# Patient Record
Sex: MALE | Race: WHITE | NOT HISPANIC OR LATINO | URBAN - METROPOLITAN AREA
[De-identification: names, ages, dates, MRNs, and addresses within clinical notes are randomized per-mention and may not be internally consistent; named-entity substitution may affect disease eponyms.]

---

## 2020-07-06 ENCOUNTER — EMERGENCY (EMERGENCY)
Facility: HOSPITAL | Age: 55
LOS: 0 days | Discharge: HOME | End: 2020-07-06
Payer: MEDICAID

## 2020-07-06 VITALS
HEIGHT: 70 IN | TEMPERATURE: 99 F | OXYGEN SATURATION: 97 % | WEIGHT: 149.91 LBS | SYSTOLIC BLOOD PRESSURE: 151 MMHG | HEART RATE: 120 BPM | RESPIRATION RATE: 16 BRPM | DIASTOLIC BLOOD PRESSURE: 102 MMHG

## 2020-07-06 VITALS
HEART RATE: 109 BPM | RESPIRATION RATE: 15 BRPM | OXYGEN SATURATION: 96 % | TEMPERATURE: 98 F | SYSTOLIC BLOOD PRESSURE: 142 MMHG | DIASTOLIC BLOOD PRESSURE: 91 MMHG

## 2020-07-06 DIAGNOSIS — S50.11XA CONTUSION OF RIGHT FOREARM, INITIAL ENCOUNTER: ICD-10-CM
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
## 2020-07-06 PROCEDURE — 73080 X-RAY EXAM OF ELBOW: CPT | Mod: 26,RT

## 2020-07-06 PROCEDURE — 99284 EMERGENCY DEPT VISIT MOD MDM: CPT

## 2020-07-06 PROCEDURE — 73090 X-RAY EXAM OF FOREARM: CPT | Mod: 26,RT
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
## 2020-07-06 NOTE — ED PROVIDER NOTE - CLINICAL SUMMARY MEDICAL DECISION MAKING FREE TEXT BOX
ELEVATION, ICE, CONTINUE KEFLEX, AND SEE DR WOMACK IN FEW DAYS  Discussed results and outcome of testing with the patient.  Patient advised to please follow up with their primary care doctor within the next 24-48 hours and return to the Emergency Department for worsening symptoms or any other concerns.  Patient advised that their doctor may call  to follow up on the specific results of the tests performed today in the emergency department.
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
## 2022-02-21 NOTE — ED ADULT NURSE NOTE - CHPI ED NUR SYMPTOMS POS
Constitutional: (-) fever, (-) chills  Eyes: (-) visual changes  ENT: (-) nasal congestions  Cardiovascular: (-) chest pain, (-) syncope  Respiratory: (-) cough, (-) shortness of breath, (-) dyspnea,   Gastrointestinal: (-) vomiting, (-) diarrhea, (-)nausea,  Musculoskeletal: (-) neck pain, (-) back pain, (-) joint pain,  Integumentary: (-) rash, (-) edema, (-) bruises  Neurological: (-) headache, (-) loc, (-) dizziness, (-) tingling, (-)numbness,  Peripheral Vascular: (-) leg swelling  :  (-)dysuria,  (-) hematuria  Allergic/Immunologic: (-) pruritus PAIN
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
## 2024-01-11 NOTE — ED PROVIDER NOTE - PATIENT PORTAL LINK FT
You can access the FollowMyHealth Patient Portal offered by [facility] by registering at the following website: [email or web address]. By joining [email or web address]’s FollowMyHealth portal, you will also be able to view your health information using other applications (apps) compatible with our system.
PAST SURGICAL HISTORY:  History of tonsillectomy at 20 yrs old    S/P tonsillectomy

## 2024-06-10 ENCOUNTER — EMERGENCY (EMERGENCY)
Facility: HOSPITAL | Age: 59
LOS: 0 days | Discharge: HOME | End: 2024-06-10
Attending: STUDENT IN AN ORGANIZED HEALTH CARE EDUCATION/TRAINING PROGRAM
Payer: MEDICAID

## 2024-06-10 VITALS
DIASTOLIC BLOOD PRESSURE: 73 MMHG | HEART RATE: 95 BPM | SYSTOLIC BLOOD PRESSURE: 112 MMHG | OXYGEN SATURATION: 100 % | TEMPERATURE: 98 F | RESPIRATION RATE: 18 BRPM

## 2024-06-10 VITALS
SYSTOLIC BLOOD PRESSURE: 161 MMHG | HEIGHT: 70 IN | OXYGEN SATURATION: 100 % | RESPIRATION RATE: 18 BRPM | DIASTOLIC BLOOD PRESSURE: 103 MMHG | WEIGHT: 139.99 LBS | HEART RATE: 111 BPM | TEMPERATURE: 98 F
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
## 2024-06-10 DIAGNOSIS — M25.531 PAIN IN RIGHT WRIST: ICD-10-CM

## 2024-06-10 DIAGNOSIS — I10 ESSENTIAL (PRIMARY) HYPERTENSION: ICD-10-CM

## 2024-06-10 DIAGNOSIS — F17.200 NICOTINE DEPENDENCE, UNSPECIFIED, UNCOMPLICATED: ICD-10-CM
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
## 2024-06-10 DIAGNOSIS — S52.501A UNSPECIFIED FRACTURE OF THE LOWER END OF RIGHT RADIUS, INITIAL ENCOUNTER FOR CLOSED FRACTURE: ICD-10-CM
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
## 2024-06-10 PROCEDURE — 73130 X-RAY EXAM OF HAND: CPT | Mod: 26,RT

## 2024-06-10 PROCEDURE — 99285 EMERGENCY DEPT VISIT HI MDM: CPT

## 2024-06-10 PROCEDURE — 73080 X-RAY EXAM OF ELBOW: CPT | Mod: 26,RT

## 2024-06-10 PROCEDURE — 73090 X-RAY EXAM OF FOREARM: CPT | Mod: 26,RT,76

## 2024-06-10 PROCEDURE — 73110 X-RAY EXAM OF WRIST: CPT | Mod: 26,RT,76

## 2024-06-10 RX ORDER — ACETAMINOPHEN 500 MG
650 TABLET ORAL ONCE
Refills: 0 | Status: COMPLETED | OUTPATIENT
Start: 2024-06-10 | End: 2024-06-10

## 2024-06-10 RX ORDER — IBUPROFEN 200 MG
600 TABLET ORAL ONCE
Refills: 0 | Status: COMPLETED | OUTPATIENT
Start: 2024-06-10 | End: 2024-06-10

## 2024-06-10 RX ORDER — CEPHALEXIN 500 MG
1 CAPSULE ORAL
Qty: 0 | Refills: 0 | DISCHARGE

## 2024-06-10 RX ADMIN — Medication 650 MILLIGRAM(S): at 09:58

## 2024-06-10 RX ADMIN — Medication 600 MILLIGRAM(S): at 08:04

## 2024-06-10 RX ADMIN — Medication 600 MILLIGRAM(S): at 09:58

## 2024-06-10 RX ADMIN — Medication 650 MILLIGRAM(S): at 08:04

## 2024-06-10 NOTE — ED ADULT TRIAGE NOTE - CHIEF COMPLAINT QUOTE
c/o R wrist pain since last night. States he was playing baseball and "dove down" to try and catch ball when he heard a pop. Unable to move wrist, swelling noted.

## 2024-06-10 NOTE — ED PROVIDER NOTE - PHYSICAL EXAMINATION
Gen: AOX3, NAD  Head: NCAT  ENT: Airway patent, moist mucous membranes, nasal passageways clear  Cardiac: Normal rate, rhythm   Respiratory: normal respirations and normal work of breathing , lungs cta b/l   Gastrointestinal: Abdomen nondistended, central adiposity absent  MSK: + right wrist swelling and ecchymosis with ttp and mild local deformity, no elbow/shoulder pain, normal radial/median/ulnar motor/sensory fcn, compartments soft, 2+ Radial/ulnar pulses RUE , no midline c-t-l spine ttp.   Skin: No rashes, no lesions  Neuro: No gross neurologic deficits, normal speech, no gait abnormality

## 2024-06-10 NOTE — ED PROVIDER NOTE - PROVIDER TOKENS
Call placed to patient, no answer. Left VM for patient to return call.  Patient can schedule with Karla WALL to inquire about continuing Vvyanse script.   We will need records from office that diagnosed patient with ADD. Script has not been filled since May of 2023.   PROVIDER:[TOKEN:[[id number]]]

## 2024-06-10 NOTE — CONSULT NOTE ADULT - SUBJECTIVE AND OBJECTIVE BOX
HPI  59yMale R-hand dominant c/o R wrist pain s/p mechanical fall while playing baseball last night. Denies headstrike or LOC. Denies numbness/tingling in the RUE. Denies any other trauma/injuries at this time.    ROS  Negative unless otherwise specified in HPI.    PAST MEDICAL & SURGICAL Hx  PAST MEDICAL & SURGICAL HISTORY:  HTN (hypertension)      No significant past surgical history          MEDICATIONS  Home Medications:  cephalexin 500 mg oral capsule: 1 cap(s) orally 3 times a day (10 Hernando 2024 07:38)      ALLERGIES  No Known Allergies      FAMILY Hx  FAMILY HISTORY:      SOCIAL Hx  Social History:      VITALS  Vital Signs Last 24 Hrs  T(C): 36.4 (10 Hernando 2024 07:15), Max: 36.4 (10 Hernando 2024 07:15)  T(F): 97.6 (10 Hernando 2024 07:15), Max: 97.6 (10 Hernando 2024 07:15)  HR: 111 (10 Hernando 2024 07:15) (111 - 111)  BP: 161/103 (10 Hernando 2024 07:15) (161/103 - 161/103)  BP(mean): --  RR: 18 (10 Hernando 2024 07:15) (18 - 18)  SpO2: 100% (10 Hernando 2024 07:15) (100% - 100%)    Parameters below as of 10 Hernando 2024 07:15  Patient On (Oxygen Delivery Method): room air        PHYSICAL EXAM  Gen: Lying in bed, NAD  Resp: No increased WOB  RUE:  Skin intact, +visible wrist deformity, +edema and +ecchymosis over wrist  +TTP over wrist, no TTP along remainder of extremity; compartments soft  Limited ROM at wrist 2/2 pain  Motor: AIN/PIN/U intact  Sensory: Med/Rad/U SILT  +Rad pulse, fingers WWP    Secondary Assessment:  NC/AT, NTTP of clavicles, NTTP of C-spine,T-spine, or L-spine in the midline and paraspinal areas; NTTP of pelvis  LUE: NTTP of Shoulder, Elbow, Wrist, Hand; NT with AROM/PROM of Shoulder, Elbow, Wrist, Hand; AIN/PIN/Med/Uln/Msc/Rad/Ax intact  LLE: Able to SLR, NT with Log Roll, NT with Heel Strike, NTTP of Hip, Knee, Ankle, Foot; NT with AROM/PROM of Hip, Knee, Ankle, Foot; Q/H/GSC/TA/EHL/FHL intact  RLE: Able to SLR, NT with Log Roll, NT with Heel Strike, NTTP of Hip, Knee, Ankle, Foot; NT with AROM/PROM of Hip, Knee, Ankle, Foot; Q/H/GSC/TA/EHL/FHL intact      LABS              IMAGING  XRs: R distal radius fx (personal read)    PROCEDURE  Using aseptic technique, a hematoma block was administered to the fracture site using 10cc of 1% lidocaine without epinephrine. Closed reduction was subsequently performed and a well-padded, well-molded plaster splint applied. The patient tolerated the procedure well without evidence of complications. The patient was neurovascularly intact following reduction. Post-reduction XRs demonstrated acceptable alignment. The patient was informed about splint precautions (keep dry, do not stick anything inside, monitor for signs/symptoms of increased compartmental pressure: uncontrolled pain, worsening numbness/tingling, severe pain with movement of the fingers) and verbalized understanding.    ASSESSMENT & PLAN  59yMale w/ R distal radius fx s/p closed reduction and immobilization.  -NWB RUE in a sugar tong splint (and sling for comfort PRN, does not need to wear sling at all times)  -splint precautions  -pain control  -ice/cold compress, elevation  -finger ROM encouraged to prevent stiffness  -no acute ortho surgery at this time  -f/u outpt with Dr. Simpson within 1 week, call office for appt

## 2024-06-10 NOTE — ED PROVIDER NOTE - OBJECTIVE STATEMENT
59-year-old male past medical history of hypertension presents with right wrist pain, and difficulty moving right wrist joint, occurring after fall yesterday on outstretched hand.  Denies any head injury or neck pain or back pain or other injuries.  Denies any numbness

## 2024-06-10 NOTE — ED ADULT NURSE NOTE - NSFALLUNIVINTERV_ED_ALL_ED
Bed/Stretcher in lowest position, wheels locked, appropriate side rails in place/Call bell, personal items and telephone in reach/Instruct patient to call for assistance before getting out of bed/chair/stretcher/Non-slip footwear applied when patient is off stretcher/Council to call system/Physically safe environment - no spills, clutter or unnecessary equipment/Purposeful proactive rounding/Room/bathroom lighting operational, light cord in reach

## 2024-06-10 NOTE — ED ADULT NURSE NOTE - NS ED NURSE DC INFO COMPLEXITY
myron
Simple: Patient demonstrates quick and easy understanding/Straightforward: Basic instructions, no meds, no home treatment/Verbalized Understanding

## 2024-06-10 NOTE — ED PROVIDER NOTE - CARE PROVIDER_API CALL
Jose Manuel Simpson  Orthopaedic Surgery  [address] [place] [address]  Phone: [phone]  Fax: [phone]  Follow Up Time:

## 2024-06-10 NOTE — ED PROVIDER NOTE - NSFOLLOWUPINSTRUCTIONS_ED_ALL_ED_FT
You were seen today for distal right radius fracture - you were placed in splint by orthopedics    Leave splint on at all times. Do not get splint wet. To decrease swelling - ice, elevate, and rest. No heavy lifting on the right arm     If you develop pain - you can take   Take ibuprofen 600 mg every 8 hours as needed for pain with food and plenty of water for up to 5 days  Take tylenol 650 mg every 6 hours as needed for pain    Return for any severe pain, numbness, or coolness of the extremities    Call orthopedist for follow up appointment in 1 week. Use sling for support

## 2024-06-10 NOTE — ED ADULT NURSE NOTE - OBJECTIVE STATEMENT
patient alert and oriented x4. patient 58 yo male w. PMH HTN c/o right wrist pain. states he was playing baseball yesterday and dove to catch a ball and landed sideways on his wrist. states he heard a "pop." pain 10/10, states he did not take any medications for pain today. denies head injury. swelling noted to right wrist, no deformity noted.

## 2024-06-10 NOTE — ED PROVIDER NOTE - CLINICAL SUMMARY MEDICAL DECISION MAKING FREE TEXT BOX
59-year-old male past medical history of hypertension presents with right wrist pain, and difficulty moving right wrist joint, occurring after fall yesterday on outstretched hand.  Denies any head injury or neck pain or back pain or other injuries.  Denies any numbness  - rule out fx xray right wrist, forearm and hand, analgesia, re-eval.,

## 2024-06-10 NOTE — ED PROVIDER NOTE - PATIENT PORTAL LINK FT
You can access the FollowMyHealth Patient Portal offered by [facility] by registering at the following website: [email or web address]. By joining [employer]’s FollowMyHealth portal, you will also be able to view your health information using other applications (apps) compatible with our system.

## 2024-06-12 NOTE — ED ADULT TRIAGE NOTE - BP NONINVASIVE SYSTOLIC (MM HG)
Per Jimmy CHU pt was giving #30 of auvelity with written and verbal instructions     Electronically signed by Peng Ricks on 6/12/2024 at 4:28 PM     151